# Patient Record
Sex: FEMALE | Race: WHITE | URBAN - METROPOLITAN AREA
[De-identification: names, ages, dates, MRNs, and addresses within clinical notes are randomized per-mention and may not be internally consistent; named-entity substitution may affect disease eponyms.]

---

## 2021-12-02 ENCOUNTER — TESTING ONLY (OUTPATIENT)
Dept: URBAN - METROPOLITAN AREA CLINIC 50 | Facility: CLINIC | Age: 76
End: 2021-12-02
Payer: MEDICARE

## 2021-12-02 PROCEDURE — 92083 EXTENDED VISUAL FIELD XM: CPT | Performed by: OPTOMETRIST

## 2021-12-02 PROCEDURE — 92133 CPTRZD OPH DX IMG PST SGM ON: CPT | Performed by: OPTOMETRIST

## 2021-12-15 ENCOUNTER — OFFICE VISIT (OUTPATIENT)
Dept: URBAN - METROPOLITAN AREA CLINIC 50 | Facility: CLINIC | Age: 76
End: 2021-12-15
Payer: MEDICARE

## 2021-12-15 DIAGNOSIS — H40.1131 PRIMARY OPEN-ANGLE GLAUCOMA, MILD STAGE, BILATERAL: Primary | ICD-10-CM

## 2021-12-15 DIAGNOSIS — H34.231 RETINAL ARTERY BRANCH OCCLUSION, RIGHT EYE: ICD-10-CM

## 2021-12-15 PROCEDURE — 99212 OFFICE O/P EST SF 10 MIN: CPT | Performed by: OPHTHALMOLOGY

## 2021-12-15 ASSESSMENT — INTRAOCULAR PRESSURE
OD: 10
OS: 10

## 2021-12-15 NOTE — IMPRESSION/PLAN
Impression: Primary open-angle glaucoma, mild stage, bilateral: H40.1131. Plan: Patient's intraocular pressure is within acceptable range and examination is unchanged. Continue current treatment. - Continue as previously prescribed Brimonidine 0.2% (Cap Color: Purple): Instill one drop in both eyes twice daily - Begin Latanoprost 0.005 % eye drops (Cap Color: Teal):  Instill one drop in both eyes at bedtime

## 2022-03-15 ENCOUNTER — OFFICE VISIT (OUTPATIENT)
Dept: URBAN - METROPOLITAN AREA CLINIC 50 | Facility: CLINIC | Age: 77
End: 2022-03-15
Payer: MEDICARE

## 2022-03-15 DIAGNOSIS — H40.1111 PRIMARY OPEN-ANGLE GLAUCOMA, MILD STAGE, RIGHT EYE: Primary | ICD-10-CM

## 2022-03-15 DIAGNOSIS — H25.13 AGE-RELATED NUCLEAR CATARACT, BILATERAL: ICD-10-CM

## 2022-03-15 DIAGNOSIS — H40.1123 PRIMARY OPEN-ANGLE GLAUCOMA, SEVERE STAGE, LEFT EYE: ICD-10-CM

## 2022-03-15 PROCEDURE — 99213 OFFICE O/P EST LOW 20 MIN: CPT | Performed by: OPHTHALMOLOGY

## 2022-03-15 RX ORDER — LATANOPROST 50 UG/ML
0.005 % SOLUTION/ DROPS OPHTHALMIC
Refills: 0 | Status: ACTIVE
Start: 2022-03-15

## 2022-03-15 RX ORDER — LOSARTAN POTASSIUM 25 MG/1
25 MG TABLET ORAL
Refills: 0 | Status: ACTIVE
Start: 2022-03-15

## 2022-03-15 RX ORDER — BRIMONIDINE TARTRATE 2 MG/ML
0.2 % SOLUTION/ DROPS OPHTHALMIC
Refills: 0 | Status: ACTIVE
Start: 2022-03-15

## 2022-03-15 RX ORDER — TIMOLOL MALEATE 5 MG/ML
0.5 % SOLUTION/ DROPS OPHTHALMIC
Qty: 5 | Refills: 11 | Status: ACTIVE
Start: 2022-03-15

## 2022-03-15 ASSESSMENT — INTRAOCULAR PRESSURE
OS: 17
OD: 15
OS: 15
OD: 18

## 2022-03-15 NOTE — IMPRESSION/PLAN
Impression: Primary open-angle glaucoma, mild stage, right eye: H40.1111. Plan: - Continue as previously prescribed Brimonidine 0.2% : Instill one drop in both eyes twice daily - Continue as previously prescribed Latanoprost 0.005 % eye drops : Instill one drop in both eyes at bedtime. Start Timolol BID in both eyes.

## 2022-04-13 ENCOUNTER — OFFICE VISIT (OUTPATIENT)
Dept: URBAN - METROPOLITAN AREA CLINIC 50 | Facility: CLINIC | Age: 77
End: 2022-04-13
Payer: MEDICARE

## 2022-04-13 DIAGNOSIS — H40.1111 PRIMARY OPEN-ANGLE GLAUCOMA, MILD STAGE, RIGHT EYE: ICD-10-CM

## 2022-04-13 DIAGNOSIS — H40.1123 PRIMARY OPEN-ANGLE GLAUCOMA, SEVERE STAGE, LEFT EYE: Primary | ICD-10-CM

## 2022-04-13 PROCEDURE — 99212 OFFICE O/P EST SF 10 MIN: CPT | Performed by: OPHTHALMOLOGY

## 2022-04-13 NOTE — IMPRESSION/PLAN
Impression: Primary open-angle glaucoma, severe stage, left eye: H40.1123. Plan: Patient's intraocular pressure is within acceptable range and examination is unchanged. Continue current treatment. - Continue as previously prescribed Latanoprost 0.005 % eye drops : Instill one drop in both eyes at bedtime
 - Continue as previously prescribed Brimonidine 0.2% : Instill one drop in both eyes twice daily
  - Continue as previously prescribed Timolol 0.5% :  Instill one drop in both eyes twice daily

## 2022-07-20 ENCOUNTER — OFFICE VISIT (OUTPATIENT)
Dept: URBAN - METROPOLITAN AREA CLINIC 50 | Facility: CLINIC | Age: 77
End: 2022-07-20
Payer: MEDICARE

## 2022-07-20 DIAGNOSIS — H40.1131 PRIMARY OPEN-ANGLE GLAUCOMA, MILD STAGE, BILATERAL: Primary | ICD-10-CM

## 2022-07-20 DIAGNOSIS — H25.13 AGE-RELATED NUCLEAR CATARACT, BILATERAL: ICD-10-CM

## 2022-07-20 PROCEDURE — 99213 OFFICE O/P EST LOW 20 MIN: CPT | Performed by: OPHTHALMOLOGY

## 2022-07-20 RX ORDER — LATANOPROST 50 UG/ML
0.005 % SOLUTION/ DROPS OPHTHALMIC
Qty: 2.5 | Refills: 11 | Status: ACTIVE
Start: 2022-07-20

## 2022-07-20 RX ORDER — BRIMONIDINE TARTRATE 2 MG/ML
0.2 % SOLUTION/ DROPS OPHTHALMIC
Qty: 10 | Refills: 11 | Status: ACTIVE
Start: 2022-07-20

## 2022-07-20 ASSESSMENT — INTRAOCULAR PRESSURE
OS: 12
OD: 16

## 2022-07-20 NOTE — IMPRESSION/PLAN
Impression: Primary open-angle glaucoma, mild stage, bilateral: H40.1131. Plan: - Continue as previously prescribed Timolol 0.5% : Instill one drop in both eyes twice daily, Brimonidine twice daily, and Latanoprost at bedtime in both eyes.

## 2022-11-30 ENCOUNTER — OFFICE VISIT (OUTPATIENT)
Dept: URBAN - METROPOLITAN AREA CLINIC 50 | Facility: CLINIC | Age: 77
End: 2022-11-30
Payer: MEDICARE

## 2022-11-30 DIAGNOSIS — H40.1123 PRIMARY OPEN-ANGLE GLAUCOMA, LEFT EYE, SEVERE STAGE: ICD-10-CM

## 2022-11-30 DIAGNOSIS — H25.13 AGE-RELATED NUCLEAR CATARACT, BILATERAL: ICD-10-CM

## 2022-11-30 DIAGNOSIS — H40.1111 PRIMARY OPEN-ANGLE GLAUCOMA, MILD STAGE, RIGHT EYE: Primary | ICD-10-CM

## 2022-11-30 PROCEDURE — 99213 OFFICE O/P EST LOW 20 MIN: CPT | Performed by: OPHTHALMOLOGY

## 2022-11-30 ASSESSMENT — INTRAOCULAR PRESSURE
OD: 16
OD: 14
OS: 12
OS: 13

## 2022-11-30 NOTE — IMPRESSION/PLAN
Impression: Primary open-angle glaucoma, mild stage, right eye: H40.1111. Plan: Patient's intraocular pressure is within acceptable range and examination is unchanged. Continue current treatment. - Continue as previously prescribed Latanoprost 0.005 % eye drops : Instill one drop in both eyes at bedtime, Brimonidine twice daily and Timolol twice daily in both eyes.

## 2022-11-30 NOTE — IMPRESSION/PLAN
Impression: Primary open-angle glaucoma, left eye, severe stage: R15.5469. Plan: Patient's intraocular pressure is within acceptable range and examination is unchanged. Continue current treatment. - Continue as previously prescribed Latanoprost 0.005 % eye drops : Instill one drop in both eyes at bedtime, Brimonidine twice daily and Timolol twice daily in both eyes.

## 2023-03-02 NOTE — IMPRESSION/PLAN
Impression: Age-related nuclear cataract, bilateral: H25.13. Plan: Patient advised there is a cataract, but that visual functioning is good, and cataract surgery is not required at this time. Further advised there is no specific urgency for cataract surgery. They were also advised that having cataract surgery does not mean they will not need to use glasses or contact lenses. We will continue to monitor and they are advised to call or return if any new symptoms. Dupixent Pregnancy And Lactation Text: This medication likely crosses the placenta but the risk for the fetus is uncertain. This medication is excreted in breast milk.

## 2023-03-03 ENCOUNTER — TESTING ONLY (OUTPATIENT)
Dept: URBAN - METROPOLITAN AREA CLINIC 50 | Facility: CLINIC | Age: 78
End: 2023-03-03
Payer: MEDICARE

## 2023-03-03 DIAGNOSIS — H40.1111 PRIMARY OPEN-ANGLE GLAUCOMA, RIGHT EYE, MILD STAGE: Primary | ICD-10-CM

## 2023-05-22 ENCOUNTER — OFFICE VISIT (OUTPATIENT)
Dept: URBAN - METROPOLITAN AREA CLINIC 50 | Facility: CLINIC | Age: 78
End: 2023-05-22
Payer: MEDICARE

## 2023-05-22 DIAGNOSIS — H40.1111 PRIMARY OPEN-ANGLE GLAUCOMA, RIGHT EYE, MILD STAGE: ICD-10-CM

## 2023-05-22 DIAGNOSIS — H25.13 AGE-RELATED NUCLEAR CATARACT, BILATERAL: ICD-10-CM

## 2023-05-22 DIAGNOSIS — H40.1123 PRIMARY OPEN-ANGLE GLAUCOMA, LEFT EYE, SEVERE STAGE: Primary | ICD-10-CM

## 2023-05-22 PROCEDURE — 99213 OFFICE O/P EST LOW 20 MIN: CPT | Performed by: OPHTHALMOLOGY

## 2023-05-22 PROCEDURE — 92083 EXTENDED VISUAL FIELD XM: CPT | Performed by: OPHTHALMOLOGY

## 2023-05-22 PROCEDURE — 92133 CPTRZD OPH DX IMG PST SGM ON: CPT | Performed by: OPHTHALMOLOGY

## 2023-05-22 RX ORDER — DORZOLAMIDE HYDROCHLORIDE AND TIMOLOL MALEATE 20; 5 MG/ML; MG/ML
SOLUTION/ DROPS OPHTHALMIC
Qty: 10 | Refills: 11 | Status: ACTIVE
Start: 2023-05-22

## 2023-05-22 RX ORDER — BRIMONIDINE TARTRATE 2 MG/ML
0.2 % SOLUTION/ DROPS OPHTHALMIC
Qty: 10 | Refills: 11 | Status: ACTIVE
Start: 2023-05-22

## 2023-05-22 RX ORDER — LATANOPROST 50 UG/ML
0.005 % SOLUTION/ DROPS OPHTHALMIC
Qty: 2.5 | Refills: 11 | Status: ACTIVE
Start: 2023-05-22

## 2023-05-22 ASSESSMENT — INTRAOCULAR PRESSURE
OD: 13
OS: 12

## 2023-06-16 ENCOUNTER — OFFICE VISIT (OUTPATIENT)
Dept: URBAN - METROPOLITAN AREA CLINIC 50 | Facility: CLINIC | Age: 78
End: 2023-06-16
Payer: MEDICARE

## 2023-06-16 DIAGNOSIS — H52.13 MYOPIA, BILATERAL: Primary | ICD-10-CM

## 2023-06-16 ASSESSMENT — INTRAOCULAR PRESSURE
OD: 14
OS: 16

## 2023-06-16 ASSESSMENT — VISUAL ACUITY
OD: 20/30
OS: 20/25

## 2023-08-18 ENCOUNTER — TESTING ONLY (OUTPATIENT)
Dept: URBAN - METROPOLITAN AREA CLINIC 50 | Facility: CLINIC | Age: 78
End: 2023-08-18
Payer: MEDICARE

## 2023-08-18 DIAGNOSIS — H40.1111 PRIMARY OPEN-ANGLE GLAUCOMA, RIGHT EYE, MILD STAGE: Primary | ICD-10-CM

## 2023-09-25 ENCOUNTER — OFFICE VISIT (OUTPATIENT)
Dept: URBAN - METROPOLITAN AREA CLINIC 50 | Facility: CLINIC | Age: 78
End: 2023-09-25
Payer: MEDICARE

## 2023-09-25 DIAGNOSIS — H25.13 AGE-RELATED NUCLEAR CATARACT, BILATERAL: ICD-10-CM

## 2023-09-25 DIAGNOSIS — H40.1111 PRIMARY OPEN-ANGLE GLAUCOMA, MILD STAGE, RIGHT EYE: ICD-10-CM

## 2023-09-25 DIAGNOSIS — H40.1123 PRIMARY OPEN-ANGLE GLAUCOMA, LEFT EYE, SEVERE STAGE: Primary | ICD-10-CM

## 2023-09-25 PROCEDURE — 92083 EXTENDED VISUAL FIELD XM: CPT | Performed by: OPHTHALMOLOGY

## 2023-09-25 PROCEDURE — 99213 OFFICE O/P EST LOW 20 MIN: CPT | Performed by: OPHTHALMOLOGY

## 2023-09-25 RX ORDER — BRIMONIDINE TARTRATE 2 MG/ML
0.2 % SOLUTION/ DROPS OPHTHALMIC
Qty: 10 | Refills: 11 | Status: ACTIVE
Start: 2023-09-25

## 2023-09-25 RX ORDER — LATANOPROST 50 UG/ML
0.005 % SOLUTION OPHTHALMIC
Qty: 2.5 | Refills: 11 | Status: INACTIVE
Start: 2023-09-25 | End: 2023-09-25

## 2023-09-25 RX ORDER — DORZOLAMIDE HYDROCHLORIDE AND TIMOLOL MALEATE 20; 5 MG/ML; MG/ML
SOLUTION/ DROPS OPHTHALMIC
Qty: 10 | Refills: 11 | Status: ACTIVE
Start: 2023-09-25

## 2023-09-25 RX ORDER — LATANOPROST 50 UG/ML
0.005 % SOLUTION OPHTHALMIC
Qty: 2.5 | Refills: 11 | Status: ACTIVE
Start: 2023-09-25

## 2023-09-25 RX ORDER — DORZOLAMIDE HYDROCHLORIDE AND TIMOLOL MALEATE 20; 5 MG/ML; MG/ML
SOLUTION/ DROPS OPHTHALMIC
Qty: 10 | Refills: 11 | Status: INACTIVE
Start: 2023-09-25 | End: 2023-09-25

## 2023-09-25 ASSESSMENT — INTRAOCULAR PRESSURE
OD: 17
OD: 22
OS: 14
OS: 18
OS: 15

## 2023-10-05 ENCOUNTER — OFFICE VISIT (OUTPATIENT)
Dept: URBAN - METROPOLITAN AREA CLINIC 46 | Facility: CLINIC | Age: 78
End: 2023-10-05
Payer: MEDICARE

## 2023-10-05 DIAGNOSIS — H40.1112 PRIMARY OPEN-ANGLE GLAUCOMA, MODERATE STAGE, RIGHT EYE: ICD-10-CM

## 2023-10-05 DIAGNOSIS — H25.23 AGE-RELATED CATARACT, MORGANIAN TYPE, BILATERAL: Primary | ICD-10-CM

## 2023-10-05 DIAGNOSIS — H40.1423 CAPSLR GLAUCOMA W/PSUEDOXF LENS, LEFT EYE, SEVERE STAGE: ICD-10-CM

## 2023-10-05 DIAGNOSIS — H40.1123 PRIMARY OPEN-ANGLE GLAUCOMA, SEVERE STAGE, LEFT EYE: ICD-10-CM

## 2023-10-05 DIAGNOSIS — H40.1412 CAPSLR GLAUCOMA W/PSEUDOEF LENS, RIGHT EYE, MODERATE STAGE: ICD-10-CM

## 2023-10-05 PROCEDURE — 99214 OFFICE O/P EST MOD 30 MIN: CPT | Performed by: OPHTHALMOLOGY

## 2023-10-05 PROCEDURE — 92136 OPHTHALMIC BIOMETRY: CPT | Performed by: OPHTHALMOLOGY

## 2023-10-05 ASSESSMENT — INTRAOCULAR PRESSURE
OS: 13
OD: 14